# Patient Record
Sex: FEMALE | Race: WHITE | Employment: FULL TIME | URBAN - METROPOLITAN AREA
[De-identification: names, ages, dates, MRNs, and addresses within clinical notes are randomized per-mention and may not be internally consistent; named-entity substitution may affect disease eponyms.]

---

## 2017-01-13 ENCOUNTER — HOSPITAL ENCOUNTER (OUTPATIENT)
Dept: ULTRASOUND IMAGING | Age: 32
Discharge: HOME | End: 2017-01-13
Attending: OBSTETRICS & GYNECOLOGY | Admitting: OBSTETRICS & GYNECOLOGY

## 2017-01-13 DIAGNOSIS — D25.9 UTERINE LEIOMYOMA, UNSPECIFIED LOCATION: ICD-10-CM

## 2017-01-13 DIAGNOSIS — D25.9 LEIOMYOMA OF UTERUS: ICD-10-CM

## 2017-05-02 ENCOUNTER — HOSPITAL ENCOUNTER (OUTPATIENT)
Dept: MRI IMAGING | Age: 32
Discharge: HOME | End: 2017-05-02
Attending: NEUROLOGICAL SURGERY | Admitting: NEUROLOGICAL SURGERY

## 2017-05-02 DIAGNOSIS — R51.9 HEADACHE: ICD-10-CM

## 2017-05-02 DIAGNOSIS — R51.9 HEADACHE, UNSPECIFIED HEADACHE TYPE: ICD-10-CM

## 2017-05-11 ENCOUNTER — EMPLOYEE WELLNESS (OUTPATIENT)
Dept: OTHER | Age: 32
End: 2017-05-11

## 2017-05-11 LAB
CHOLESTEROL, TOTAL: 180 MG/DL (ref 0–199)
GLUCOSE BLD-MCNC: 71 MG/DL (ref 70–99)
HDLC SERPL-MCNC: 50 MG/DL (ref 40–60)
LDL CHOLESTEROL CALCULATED: 105 MG/DL
TRIGL SERPL-MCNC: 127 MG/DL (ref 0–150)

## 2017-06-09 DIAGNOSIS — D25.9 UTERINE LEIOMYOMA, UNSPECIFIED LOCATION: ICD-10-CM

## 2017-06-09 RX ORDER — DROSPIRENONE AND ETHINYL ESTRADIOL 0.02-3(28)
1 KIT ORAL DAILY
Qty: 28 TABLET | Refills: 3 | Status: SHIPPED | OUTPATIENT
Start: 2017-06-09 | End: 2017-09-29 | Stop reason: SDUPTHER

## 2017-09-29 DIAGNOSIS — D25.9 UTERINE LEIOMYOMA, UNSPECIFIED LOCATION: ICD-10-CM

## 2017-09-29 RX ORDER — ETHINYL ESTRADIOL/DROSPIRENONE 0.02-3(28)
TABLET ORAL
Qty: 28 TABLET | Refills: 2 | Status: SHIPPED | OUTPATIENT
Start: 2017-09-29 | End: 2017-12-11 | Stop reason: SDUPTHER

## 2017-12-11 ENCOUNTER — OFFICE VISIT (OUTPATIENT)
Dept: GYNECOLOGY | Age: 32
End: 2017-12-11

## 2017-12-11 VITALS
DIASTOLIC BLOOD PRESSURE: 83 MMHG | HEART RATE: 71 BPM | SYSTOLIC BLOOD PRESSURE: 122 MMHG | WEIGHT: 150.8 LBS | RESPIRATION RATE: 17 BRPM | BODY MASS INDEX: 25.74 KG/M2 | HEIGHT: 64 IN

## 2017-12-11 DIAGNOSIS — D25.9 UTERINE LEIOMYOMA, UNSPECIFIED LOCATION: ICD-10-CM

## 2017-12-11 DIAGNOSIS — Z01.419 WELL WOMAN EXAM WITH ROUTINE GYNECOLOGICAL EXAM: Primary | ICD-10-CM

## 2017-12-11 PROCEDURE — 99395 PREV VISIT EST AGE 18-39: CPT | Performed by: OBSTETRICS & GYNECOLOGY

## 2017-12-11 RX ORDER — DROSPIRENONE AND ETHINYL ESTRADIOL 0.02-3(28)
1 KIT ORAL DAILY
Qty: 90 TABLET | Refills: 3 | Status: SHIPPED | OUTPATIENT
Start: 2017-12-11

## 2017-12-11 ASSESSMENT — ENCOUNTER SYMPTOMS
RESPIRATORY NEGATIVE: 1
EYES NEGATIVE: 1
GASTROINTESTINAL NEGATIVE: 1

## 2017-12-13 LAB
HPV COMMENT: NORMAL
HPV TYPE 16: NOT DETECTED
HPV TYPE 18: NOT DETECTED
HPVOH (OTHER TYPES): NOT DETECTED

## 2017-12-29 RX ORDER — OSELTAMIVIR PHOSPHATE 75 MG/1
75 CAPSULE ORAL 2 TIMES DAILY
Qty: 20 CAPSULE | Refills: 1 | Status: SHIPPED | OUTPATIENT
Start: 2017-12-29 | End: 2018-01-08

## 2018-02-07 ENCOUNTER — TELEPHONE (OUTPATIENT)
Dept: FAMILY MEDICINE CLINIC | Age: 33
End: 2018-02-07

## 2018-02-07 RX ORDER — CIPROFLOXACIN 500 MG/1
500 TABLET, FILM COATED ORAL 2 TIMES DAILY
Qty: 20 TABLET | Refills: 0 | Status: SHIPPED | OUTPATIENT
Start: 2018-02-07 | End: 2018-02-17

## 2018-02-07 RX ORDER — ATOVAQUONE AND PROGUANIL HYDROCHLORIDE 250; 100 MG/1; MG/1
1 TABLET, FILM COATED ORAL DAILY
Qty: 26 TABLET | Refills: 0 | Status: SHIPPED | OUTPATIENT
Start: 2018-02-07

## 2018-02-07 NOTE — TELEPHONE ENCOUNTER
Pt is going to [facility] next week for 8 days. She would like to know if she could have some Malarone called in to her pharmacy.      Brandon Mcdonald [id number] [place], [address] [place] [address].    Please advise  Martín [phone]

## 2018-03-08 ENCOUNTER — TELEPHONE (OUTPATIENT)
Dept: FAMILY MEDICINE CLINIC | Age: 33
End: 2018-03-08

## 2018-03-08 DIAGNOSIS — Z11.59 NEED FOR HEPATITIS C SCREENING TEST: Primary | ICD-10-CM

## 2018-03-08 NOTE — TELEPHONE ENCOUNTER
Pt would like to have a order for a hepatitis c panel test. 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      Please advise  Martín [phone]

## 2018-03-09 DIAGNOSIS — Z11.59 NEED FOR HEPATITIS C SCREENING TEST: ICD-10-CM

## 2018-03-09 LAB — HEPATITIS C ANTIBODY INTERPRETATION: NORMAL

## 2018-03-20 VITALS — WEIGHT: 149 LBS | BODY MASS INDEX: 25.18 KG/M2

## 2019-05-09 ENCOUNTER — HOSPITAL ENCOUNTER (EMERGENCY)
Age: 34
Discharge: HOME | End: 2019-05-10
Attending: EMERGENCY MEDICINE
Payer: COMMERCIAL

## 2019-05-09 DIAGNOSIS — I47.1 PAROXYSMAL SUPRAVENTRICULAR TACHYCARDIA (HCC): Primary | ICD-10-CM

## 2019-05-09 DIAGNOSIS — Z34.92 SECOND TRIMESTER PREGNANCY: ICD-10-CM

## 2019-05-09 PROCEDURE — 93005 ELECTROCARDIOGRAM TRACING: CPT | Performed by: EMERGENCY MEDICINE

## 2019-05-09 PROCEDURE — 99285 EMERGENCY DEPT VISIT HI MDM: CPT

## 2019-05-10 VITALS
WEIGHT: 160 LBS | SYSTOLIC BLOOD PRESSURE: 114 MMHG | DIASTOLIC BLOOD PRESSURE: 82 MMHG | OXYGEN SATURATION: 99 % | HEART RATE: 99 BPM | RESPIRATION RATE: 16 BRPM | HEIGHT: 65 IN | BODY MASS INDEX: 26.66 KG/M2 | TEMPERATURE: 97 F

## 2019-05-10 LAB
A/G RATIO: 1.1 (ref 1.1–2.2)
ALBUMIN SERPL-MCNC: 3.6 G/DL (ref 3.4–5)
ALP BLD-CCNC: 52 U/L (ref 40–129)
ALT SERPL-CCNC: 10 U/L (ref 10–40)
ANION GAP SERPL CALCULATED.3IONS-SCNC: 11 MMOL/L (ref 3–16)
AST SERPL-CCNC: 15 U/L (ref 15–37)
BASOPHILS ABSOLUTE: 0 K/UL (ref 0–0.2)
BASOPHILS RELATIVE PERCENT: 0.4 %
BILIRUB SERPL-MCNC: <0.2 MG/DL (ref 0–1)
BUN BLDV-MCNC: 7 MG/DL (ref 7–20)
CALCIUM SERPL-MCNC: 9.2 MG/DL (ref 8.3–10.6)
CHLORIDE BLD-SCNC: 103 MMOL/L (ref 99–110)
CO2: 23 MMOL/L (ref 21–32)
CREAT SERPL-MCNC: <0.5 MG/DL (ref 0.6–1.1)
EKG ATRIAL RATE: 105 BPM
EKG ATRIAL RATE: 129 BPM
EKG DIAGNOSIS: NORMAL
EKG DIAGNOSIS: NORMAL
EKG P AXIS: 40 DEGREES
EKG P-R INTERVAL: 124 MS
EKG Q-T INTERVAL: 286 MS
EKG Q-T INTERVAL: 342 MS
EKG QRS DURATION: 78 MS
EKG QRS DURATION: 90 MS
EKG QTC CALCULATION (BAZETT): 452 MS
EKG QTC CALCULATION (BAZETT): 483 MS
EKG R AXIS: 51 DEGREES
EKG R AXIS: 73 DEGREES
EKG T AXIS: -20 DEGREES
EKG T AXIS: 36 DEGREES
EKG VENTRICULAR RATE: 105 BPM
EKG VENTRICULAR RATE: 172 BPM
EOSINOPHILS ABSOLUTE: 0.1 K/UL (ref 0–0.6)
EOSINOPHILS RELATIVE PERCENT: 0.9 %
GFR AFRICAN AMERICAN: >60
GFR NON-AFRICAN AMERICAN: >60
GLOBULIN: 3.3 G/DL
GLUCOSE BLD-MCNC: 88 MG/DL (ref 70–99)
HCT VFR BLD CALC: 41.8 % (ref 36–48)
HEMOGLOBIN: 14.1 G/DL (ref 12–16)
LYMPHOCYTES ABSOLUTE: 3.6 K/UL (ref 1–5.1)
LYMPHOCYTES RELATIVE PERCENT: 30.8 %
MCH RBC QN AUTO: 28.7 PG (ref 26–34)
MCHC RBC AUTO-ENTMCNC: 33.7 G/DL (ref 31–36)
MCV RBC AUTO: 85 FL (ref 80–100)
MONOCYTES ABSOLUTE: 0.7 K/UL (ref 0–1.3)
MONOCYTES RELATIVE PERCENT: 5.5 %
NEUTROPHILS ABSOLUTE: 7.4 K/UL (ref 1.7–7.7)
NEUTROPHILS RELATIVE PERCENT: 62.4 %
PDW BLD-RTO: 13.3 % (ref 12.4–15.4)
PLATELET # BLD: 288 K/UL (ref 135–450)
PMV BLD AUTO: 8.7 FL (ref 5–10.5)
POTASSIUM REFLEX MAGNESIUM: 3.6 MMOL/L (ref 3.5–5.1)
RBC # BLD: 4.91 M/UL (ref 4–5.2)
SODIUM BLD-SCNC: 137 MMOL/L (ref 136–145)
TOTAL PROTEIN: 6.9 G/DL (ref 6.4–8.2)
WBC # BLD: 11.8 K/UL (ref 4–11)

## 2019-05-10 PROCEDURE — 85025 COMPLETE CBC W/AUTO DIFF WBC: CPT

## 2019-05-10 PROCEDURE — 6360000002 HC RX W HCPCS: Performed by: EMERGENCY MEDICINE

## 2019-05-10 PROCEDURE — 96374 THER/PROPH/DIAG INJ IV PUSH: CPT

## 2019-05-10 PROCEDURE — 93010 ELECTROCARDIOGRAM REPORT: CPT | Performed by: INTERNAL MEDICINE

## 2019-05-10 PROCEDURE — 6360000002 HC RX W HCPCS

## 2019-05-10 PROCEDURE — 96361 HYDRATE IV INFUSION ADD-ON: CPT

## 2019-05-10 PROCEDURE — 2580000003 HC RX 258: Performed by: EMERGENCY MEDICINE

## 2019-05-10 PROCEDURE — 80053 COMPREHEN METABOLIC PANEL: CPT

## 2019-05-10 RX ORDER — 0.9 % SODIUM CHLORIDE 0.9 %
1000 INTRAVENOUS SOLUTION INTRAVENOUS ONCE
Status: COMPLETED | OUTPATIENT
Start: 2019-05-10 | End: 2019-05-10

## 2019-05-10 RX ORDER — ADENOSINE 3 MG/ML
INJECTION, SOLUTION INTRAVENOUS
Status: COMPLETED
Start: 2019-05-10 | End: 2019-05-10

## 2019-05-10 RX ORDER — ADENOSINE 3 MG/ML
6 INJECTION, SOLUTION INTRAVENOUS ONCE
Status: COMPLETED | OUTPATIENT
Start: 2019-05-10 | End: 2019-05-10

## 2019-05-10 RX ADMIN — ADENOSINE 6 MG: 3 INJECTION, SOLUTION INTRAVENOUS at 01:12

## 2019-05-10 RX ADMIN — ADENOSINE 12 MG: 3 INJECTION, SOLUTION INTRAVENOUS at 01:20

## 2019-05-10 RX ADMIN — SODIUM CHLORIDE 1000 ML: 9 INJECTION, SOLUTION INTRAVENOUS at 00:13

## 2019-05-10 NOTE — ED NOTES
Patient attempted to slow heart rate by placing face in ice water. Pulse rate went up to 200. Now down to 180. Dr Deb Alfaro made aware.       Shameka Snow RN  05/10/19 [id number]

## 2019-05-10 NOTE — ED PROVIDER NOTES
[facility]  EMERGENCY DEPT VISIT      Patient Identification  Martín Crews is a 35 y.o. female. Chief Complaint   Tachycardia (since 2245. Hx SVT 19wks preg )      History of Present Illness: This is a  35 y.o. female who presents ambulatory  to the ED with complaints of rapid palpitations and SVT. Patient has h/o SVT off and on since age 23. Has never had to be converted. Currently 18-19 weeks pregnant. States she has had more freqeunt episodes of SVT during pregnancy but usually last only up to 10 minutes. 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 Tried vagal maneuvers wtihout relief. No chest pain. No sob. No presyncope. No leg pain or swelling. Palpitations have been going on for over an hour. Past Medical History:   Diagnosis Date    Dysmenorrhea     Fibroid        Past Surgical History:   Procedure Laterality Date    BREAST SURGERY  2005    BREAST REDUCTION    TONSILLECTOMY         No current facility-administered medications for this encounter.      Current Outpatient Medications:     scopolamine (TRANSDERM-SCOP) transdermal patch, Place 1 patch onto the skin every 72 hours, Disp: 10 patch, Rfl: 1    typhoid vaccine (VIVOTIF) delayed release capsule, Take 1 capsule by mouth every other day, Disp: 4 capsule, Rfl: 0    atovaquone-proguanil (MALARONE) 250-100 MG per tablet, Take 1 tablet by mouth daily, Disp: 26 tablet, Rfl: 0    drospirenone-ethinyl estradiol (ETHAN) 3-0.02 MG per tablet, Take 1 tablet by mouth daily, Disp: 90 tablet, Rfl: 3    Multiple Vitamins-Minerals (THERAPEUTIC MULTIVITAMIN-MINERALS) tablet, Take 1 tablet by mouth daily, Disp: , Rfl:     No Known Allergies    Social History     Socioeconomic History    Marital status: Single     Spouse name: Not on file    Number of children: Not on file    Years of education: Not on file    Highest education level: Not on file   Occupational History    Not on file   Social Needs    Financial resource strain: Not on file    Food insecurity:     Worry: Not on file     Inability: Not on file    Transportation needs:     Medical: Not on file     Non-medical: Not on file   Tobacco Use    Smoking status: Never Smoker    Smokeless tobacco: Never Used   Substance and Sexual Activity    Alcohol use: Yes     Comment: RARELY    Drug use: No    Sexual activity: Yes     Partners: Male   Lifestyle    Physical activity:     Days per week: Not on file     Minutes per session: Not on file    Stress: Not on file   Relationships    Social connections:     Talks on phone: Not on file     Gets together: Not on file     Attends Hindu service: Not on file     Active member of club or organization: Not on file     Attends meetings of clubs or organizations: Not on file     Relationship status: Not on file    Intimate partner violence:     Fear of current or ex partner: Not on file     Emotionally abused: Not on file     Physically abused: Not on file     Forced sexual activity: Not on file   Other Topics Concern    Not on file   Social History Narrative    Not on file       Nursing Notes Reviewed      ROS:  GENERAL:  No fever, no chills, no diaphoresis, no appetite changes  EYES: no eye discharge, no eye redness, no visual changes  ENT: no nasal congestion, no sore throat  CARDIAC: no chest pain, + palpitations, no leg swelling  PULM: no cough, no shortness of breath  ABD: no abdominal pain, no nausea, no vomiting, no diarrhea  : no dysuria, no hematuria, no urgency, no frequency. No flank pain  MUSCULOSKELETAL: no back pain, no arthralgias, no myalgias  NEURO: no headache, no lightheadedness, no dizziness, no numbness, no weakness, no syncope  SKIN: no rashes, no erythema, no wounds, no ecchymosis      PHYSICAL EXAM:  GENERAL APPEARANCE: Martín Messer is in no acute respiratory distress. Awake and alert.   VITAL SIGNS:   ED Triage Vitals [05/09/19 2357]   Enc Vitals Group      BP (!) 133/91      Pulse 171      Resp 18      Temp 97 °F (36.1 °C)      Temp Source Oral      SpO2 98 %      Weight 160 lb (72.6 kg)      Height 5' 4.5\" (1.638 m)      Head Circumference       Peak Flow       Pain Score       Pain Loc       Pain Edu? Excl. in [address]? HEAD: Normocephalic, atraumatic. EYES:  Extraocular muscles are intact. Pupils equal round and reactive to light. Conjunctivas are pink. Negative scleral icterus. ENT:  Mucous membranes are moist.  Pharynx without erythema or exudates. NECK: Nontender and supple. No cervical adenopathy. CHEST:  Clear to auscultation bilaterally. No rales, rhonchi, or wheezing. HEART:  tachcyardic rate and regular rhythm. No murmurs. Strong and equal pulses in the upper and lower extremities. ABDOMEN: Soft,  nondistended, positive bowel sounds. abdomen is nontender. No rebound. no guarding. Gravid uterus  MUSCULOSKELETAL: The calves are nontender to palpation. Active range of motion of the upper and lower extremities. No edema. NEUROLOGICAL: Awake, alert and oriented x 3. Power intact in the upper and lower extremities. DERMATOLOGIC: No petechiae, rashes, or ecchymoses. No erythema. PSYCH: normal mood and affect. Normal thought content. ED COURSE AND MEDICAL DECISION MAKING:    EKG as interpreted by myself:  supraventricular tachycardia with a rate of 172  Axis is   Normal  QTc is  483ms  Intervals and Durations are unremarkable. Diffuse ST depression  No prior EKG    The Ekg interpreted by me in the absence of a cardiologist shows. sinus tachycardia, apkp=385   Axis is   Normal  QTc is  normal  Intervals and Durations are unremarkable. No specific ST-T wave changes appreciated. No evidence of acute ischemia. Rhythm has converted from SVT with NSR        Radiology:  Films have been read by radiologist as noted in chart unless otherwise stated.  Other radiologic studies (i.e. CT, MRI, ultrasounds, etc ) have been interpreted by radiologist.     No orders to display Labs:  Results for orders placed or performed during the hospital encounter of 05/09/19   CBC Auto Differential   Result Value Ref Range    WBC 11.8 (H) 4.0 - 11.0 K/uL    RBC 4.91 4.00 - 5.20 M/uL    Hemoglobin 14.1 12.0 - 16.0 g/dL    Hematocrit 41.8 36.0 - 48.0 %    MCV 85.0 80.0 - 100.0 fL    MCH 28.7 26.0 - 34.0 pg    MCHC 33.7 31.0 - 36.0 g/dL    RDW 13.3 12.4 - 15.4 %    Platelets 064 771 - 357 K/uL    MPV 8.7 5.0 - 10.5 fL    Neutrophils % 62.4 %    Lymphocytes % 30.8 %    Monocytes % 5.5 %    Eosinophils % 0.9 %    Basophils % 0.4 %    Neutrophils # 7.4 1.7 - 7.7 K/uL    Lymphocytes # 3.6 1.0 - 5.1 K/uL    Monocytes # 0.7 0.0 - 1.3 K/uL    Eosinophils # 0.1 0.0 - 0.6 K/uL    Basophils # 0.0 0.0 - 0.2 K/uL   Comprehensive Metabolic Panel w/ Reflex to MG   Result Value Ref Range    Sodium 137 136 - 145 mmol/L    Potassium reflex Magnesium 3.6 3.5 - 5.1 mmol/L    Chloride 103 99 - 110 mmol/L    CO2 23 21 - 32 mmol/L    Anion Gap 11 3 - 16    Glucose 88 70 - 99 mg/dL    BUN 7 7 - 20 mg/dL    CREATININE <0.5 (L) 0.6 - 1.1 mg/dL    GFR Non-African American >60 >60    GFR African American >60 >60    Calcium 9.2 8.3 - 10.6 mg/dL    Total Protein 6.9 6.4 - 8.2 g/dL    Alb 3.6 3.4 - 5.0 g/dL    Albumin/Globulin Ratio 1.1 1.1 - 2.2    Total Bilirubin <0.2 0.0 - 1.0 mg/dL    Alkaline Phosphatase 52 40 - 129 U/L    ALT 10 10 - 40 U/L    AST 15 15 - 37 U/L    Globulin 3.3 g/dL   EKG 12 Lead   Result Value Ref Range    Ventricular Rate 172 BPM    Atrial Rate 129 BPM    QRS Duration 90 ms    Q-T Interval 286 ms    QTc Calculation (Bazett) 483 ms    R Axis 73 degrees    T Axis -20 degrees    Diagnosis       Supraventricular tachycardiaMarked ST abnormality, possible inferior subendocardial injuryAbnormal ECGNo previous ECGs availableConfirmed by RENY SHI, YESY ([id number]) on 5/10/2019 4:48:17 PM   EKG 12 Lead   Result Value Ref Range    Ventricular Rate 105 BPM    Atrial Rate 105 BPM    P-R Interval 124 ms    QRS Duration 78 ms    Q-T Interval 342 ms    QTc Calculation (Bazett) 452 ms    P Axis 40 degrees    R Axis 51 degrees    T Axis 36 degrees    Diagnosis       Sinus tachycardiaOtherwise normal ECGConfirmed by Radha Benitez MD, [facility] ([facility]) on 5/10/2019 4:48:27 PM       Treatment in the department:  Patient received the following while in the ED. Medications   0.9 % sodium chloride bolus (0 mLs Intravenous Stopped 5/10/19 0110)   adenosine (ADENOCARD) injection 6 mg (12 mg Intravenous Given 5/10/19 0120)     adenocard 12mg IVP given with conversion to sinus rhythm. Patient tolerated well. I was present for conversion. Repeat exam shows no recurrence of SVT    Medical decision making:  Discussed case with dr Foley. She recommended vagal maneuvers and otherwise would recommend adenosine during pregnancy. Patient presents with one hour of SVT rates 170s-190s. No chest pain. No sob. No presyncope. Has had before but not on prophylactic meds and has not been recently seen by cardiology. She is not hypoxic. No leg pain or swelling. She has no significant electroltye disturbance. Although pregnant, since SVT has been chronic issue for her I do not feel this likely represents complication of PE in pregnancy but more simple arrhythmia. After conversion she is not tachycardic or hypoxic. She was highly encouraged to followup with cardiology which she agrees with and will see someone in [place] where she lives. She never had chest pain or sob and do not feel this is ischemic. No symptoms of CHF to suggest pregnancy induced cardiomyopathy. Again she has underlying h/o paroxysmal SVT in past for several years. I estimate there is LOW risk for ACUTE CORONARY SYNDROME,MALIGNANT DYSRHYTHMIA,  PULMONARY EMBOLISM, SEVERE ELECTROLYTE DISTURBANCE, PREGNANCY INDUCED CARDIOMYOPATHY, ,thus I consider the discharge disposition reasonable.  Shanell Hoffmann and I have discussed the diagnosis and risks, and we agree with discharging home to follow-up with their primary doctor. We also discussed returning to the Emergency Department immediately if new or worsening symptoms occur. Clinical Impression:  1. Paroxysmal supraventricular tachycardia ([address]    2. Second trimester pregnancy        Dispo:  Patient will be discharged  at this time. Patient was informed of this decision and agrees with plan. I have discussed lab and xray findings with patient and they understand. Questions were answered to the best of my ability. Discharge vitals:  Blood pressure 114/82, pulse 99, temperature 97 °F (36.1 °C), temperature source Oral, resp. rate 16, height 5' 4.5\" (1.638 m), weight 72.6 kg (160 lb), SpO2 99 %, not currently breastfeeding. Prescriptions given:   Discharge Medication List as of 5/10/2019  2:34 AM          This chart was created using Dragon voice recognition software.         Raimundo Carrillo MD  05/10/19 [id number]

## 2019-05-10 NOTE — ED NOTES
Patient discharged with instructions, follow up instructions, verbalizes understanding. Ambulates from Ed without assist, gait steady.  No distress noted, respirations even and unlabored     Jay Jorge, FREEDOM  05/10/19 3946